# Patient Record
Sex: FEMALE | Race: BLACK OR AFRICAN AMERICAN | Employment: FULL TIME | URBAN - METROPOLITAN AREA
[De-identification: names, ages, dates, MRNs, and addresses within clinical notes are randomized per-mention and may not be internally consistent; named-entity substitution may affect disease eponyms.]

---

## 2017-11-07 ENCOUNTER — HOSPITAL ENCOUNTER (OUTPATIENT)
Dept: LAB | Age: 52
Discharge: HOME | End: 2017-11-07

## 2017-11-07 LAB — T-SPOT TB TEST (EMPLOYEE),XTBE: NORMAL

## 2018-03-15 ENCOUNTER — HOSPITAL ENCOUNTER (OUTPATIENT)
Dept: LAB | Age: 53
Discharge: HOME | End: 2018-03-15

## 2018-03-15 LAB — T-SPOT TB TEST (EMPLOYEE),XTBE: NORMAL

## 2021-08-26 LAB
CHOLEST SERPL-MCNC: 228 MG/DL
GLUCOSE SERPL-MCNC: 101 MG/DL (ref 74–99)
HDLC SERPL-MCNC: 73 MG/DL (ref 40–60)
LDLC SERPL CALC-MCNC: 133.4 MG/DL (ref 0–100)
TRIGL SERPL-MCNC: 108 MG/DL (ref ?–150)

## 2022-03-23 ENCOUNTER — HOSPITAL ENCOUNTER (OUTPATIENT)
Dept: LAB | Age: 57
Discharge: HOME | End: 2022-03-23
Payer: COMMERCIAL

## 2022-03-23 LAB
ANION GAP SERPL CALC-SCNC: 1 MMOL/L (ref 3–18)
BUN SERPL-MCNC: 16 MG/DL (ref 7–18)
BUN/CREAT SERPL: 13 (ref 12–20)
CALCIUM SERPL-MCNC: 9.6 MG/DL (ref 8.5–10.1)
CHLORIDE SERPL-SCNC: 106 MMOL/L (ref 100–111)
CO2 SERPL-SCNC: 35 MMOL/L (ref 21–32)
CREAT SERPL-MCNC: 1.2 MG/DL (ref 0.6–1.3)
GLUCOSE SERPL-MCNC: 115 MG/DL (ref 74–99)
POTASSIUM SERPL-SCNC: 3.7 MMOL/L (ref 3.5–5.5)
SODIUM SERPL-SCNC: 142 MMOL/L (ref 136–145)

## 2022-03-23 PROCEDURE — 36415 COLL VENOUS BLD VENIPUNCTURE: CPT

## 2022-03-23 PROCEDURE — 80048 BASIC METABOLIC PNL TOTAL CA: CPT

## 2022-03-24 LAB
FAX TO INFO,FAXT: NORMAL
FAX TO NUMBER,FAXN: NORMAL

## 2022-07-20 ENCOUNTER — TELEPHONE (OUTPATIENT)
Dept: PHARMACY | Age: 57
End: 2022-07-20

## 2022-07-20 NOTE — TELEPHONE ENCOUNTER
Medication Management Service    Date: 7/20/2022  Patient's Name: Evan Rowan YOB: 1965            _____________________________________________________________________________________________    Attempted to schedule patient for initial visit with the [facility] Specialty Medication Service program. LVM for patient to return call at [phone], option #4. Will attempt to contact patient again at a later time. Thank you,  Kori Roman.  Era Armendariz, [address] [place] [address] Medication Management & Specialty Medication Services  [phone]

## 2022-07-22 ENCOUNTER — TELEPHONE (OUTPATIENT)
Dept: PHARMACY | Age: 57
End: 2022-07-22

## 2022-07-22 NOTE — TELEPHONE ENCOUNTER
Medication Management Service    Date: 7/22/2022  Patient's Name: Júnior Llanes YOB: 1965            _____________________________________________________________________________________________    Attempted to schedule patient for initial visit with the [facility] Specialty Medication Service program. LVM for patient to return call at [phone], option #4. Will attempt to contact patient again at a later time. Thank you,  Tarik Greer.  Julisa Gutierrez, [address] [facility] Medication Management & Specialty Medication Services  [phone]

## 2022-08-09 ENCOUNTER — TELEPHONE (OUTPATIENT)
Dept: PHARMACY | Age: 57
End: 2022-08-09

## 2022-08-09 NOTE — TELEPHONE ENCOUNTER
Medication Management Service    Date: 8/9/2022  Patient's Name: Rita Jmaes YOB: 1965            _____________________________________________________________________________________________    Scheduled patient for initial PharmD Review with the [facility] Specialty Medication Service program on 8/11 at 8 am.       Thank you,  Ricardo Mcgrath.  Juan Parham, [address] [place] [address] Medication Management & Specialty Medication Services  [phone]

## 2022-08-11 ENCOUNTER — TELEPHONE (OUTPATIENT)
Dept: PHARMACY | Age: 57
End: 2022-08-11

## 2022-08-11 NOTE — TELEPHONE ENCOUNTER
Medication Management Service    Date: 8/11/2022  Patient's Name: Lennox Hernandez YOB: 1965            _____________________________________________________________________________________________    Unable to reach patient to complete initial PharmD review as part of the [employer] Specialty Medication Service program. LVM x 3. Will attempt to contact patient again at a later time. Thank you,  Zachariah Raymond.  Chasity Woods, [address] [place] [address] Medication Management & Specialty Medication Services  [phone]

## 2022-08-15 ENCOUNTER — TELEPHONE (OUTPATIENT)
Dept: PHARMACY | Age: 57
End: 2022-08-15

## 2022-08-15 NOTE — TELEPHONE ENCOUNTER
Medication Management Service    Date: 8/15/2022  Patient's Name: Regina Douglas YOB: 1965            _____________________________________________________________________________________________    Rescheduled visit for initial PharmD review as part of the [facility] Specialty Medication Service program on 8/17/22. Thank you,  Patricia De La Vega.  Chelsea Avila, BCPS  Clinical Pharmacist   [facility] Medication Management & Specialty Medication Services  [phone]        For Pharmacy [address] [place] [address] in place: No  Recommendation Provided To: Patient/Caregiver: 1 via Telephone  Intervention Detail: Scheduled Appointment  Intervention Accepted By: Patient/Caregiver: 1  Time Spent (min): 10

## 2022-08-16 ENCOUNTER — TELEPHONE (OUTPATIENT)
Dept: PHARMACY | Age: 57
End: 2022-08-16

## 2022-08-16 NOTE — PROGRESS NOTES
Specialty Medication Service    Patient's Name: Angela Magallanes YOB: 1965      Reason for visit: Anegla Magallanes is a 64 y.o. female presenting today for Specialty Medication Service visit. Patient is prescribed SMS formulary medication, Dupixent. Medication list updated. Specialty Medication: Dupixent 300 mg  Frequency: Every 14 days   Indication: Atopic dermatitis   Initially Diagnosed: Teenager   Additional Therapy:   Diprolene topical cream PRN flares (rarely uses)    Previous Therapy:   Numerous topical medications (all were ineffective)     Specialist:   Elvira Gunn MD  [facility] Dermatology & Skin Care  [facility] [address], [place], [address] [facility]  Phone: [phone]    Specialist Progress Note Available: No, requested 8/10/22 and 8/16/22  Last Specialist Visit: Within past few months - no significant changes per patient     Allergies   Allergen Reactions    Other Plant, Animal, Environmental Runny Nose     Seasonal allergies - nasal symptoms      Past Medical History:   Diagnosis Date    Asthma     HTN (hypertension)      Social History     Socioeconomic History    Marital status: SINGLE   Tobacco Use    Smoking status: Never   Substance and Sexual Activity    Alcohol use: No      REVIEW OF CURRENT DISEASE STATE  Dermatitis/Eczema: Angela Magallanes is here for continued evaluation of skin condition related to speciality medication use. Patient states that she has had atopic dermatitis since she was a teenager. She states that her rash was \"horrible\" and located on her face, arms, and legs. She described the rash as red, itchy and flaky. She recalls trying many different topical therapies over the years that were not effective in improving her symptoms. It was decided to start [address] in 2018, and patient states that it has \"worked [employer]" for her. She states that her skin is clear and she has not had any bad breakouts since starting the medication.  If any mild rashes do occur, she usually uses the Diprolene cream and it clears up within a few days. She states that she has not had to use the Diprolene often at all and it has been at least several months since the last time she's used it. Patient states that she has not identified any triggers that may causes symptoms to return. She does also have asthma, and states that [address] has helped her respiratory symptoms as well. Overall, she is very happy with her current control. · Considering all the ways in which this condition and others affects you, how are you doing (0 = very well, 10 = very poorly)? 0   · How would you rate your pain on average? (0 = no pain, 10 = worst pain imaginable) 0 0  · During the past 4 weeks, have you missed any planned activities of daily living due to condition (work/school/other plans)? No   · During the past 4 weeks, have you had to seek urgent care, ER admission, Unplanned doctor office visit, or hospital admission? No     MEDICATIONS  Current Outpatient Medications   Medication Sig    dupilumab (DUPIXENT) 300 mg/2 mL syrg syringe 300 mg by SubCUTAneous route. losartan-hydroCHLOROthiazide (HYZAAR) 100-25 mg per tablet Take 1 Tablet by mouth in the morning. cholecalciferol (VITAMIN D3) (2,000 UNITS /50 MCG) cap capsule Take 1 Capsule by mouth in the morning. potassium chloride (K-DUR, KLOR-CON M20) 20 mEq tablet Take 1 Tablet by mouth in the morning. mometasone (NASONEX) 50 mcg/actuation nasal spray 2 Sprays by Both Nostrils route daily. ibuprofen (MOTRIN) 800 mg tablet Take 1 Tablet by mouth three (3) times daily as needed. augmented betamethasone dipropionate (DIPROLENE-AF) 0.05 % topical cream Apply  to affected area two (2) times a day. albuterol (PROVENTIL HFA, VENTOLIN HFA, PROAIR HFA) 90 mcg/actuation inhaler Take 2 Puffs by inhalation every four (4) hours as needed.     albuterol-ipratropium (DUO-NEB) 2.5 mg-0.5 mg/3 ml nebu 3 mL by Nebulization route every four (4) hours as needed. fluticasone (FLOVENT HFA) 110 mcg/actuation inhaler Take 2 Puffs by inhalation every twelve (12) hours. Nebulizer & Compressor machine 1 Each by Does Not Apply route every four (4) hours as needed. phentermine (ADIPEX-P) 37.5 mg tablet Take 37.5 mg by mouth in the morning. (Patient not taking: Reported on 8/17/2022)    montelukast (SINGULAIR) 10 mg tablet Take 1 Tab by mouth nightly. (Patient not taking: Reported on 8/17/2022)     No current facility-administered medications for this visit. - Patient not currently taking montelukast or phentermine, but requests that these remain on her list for now. Current Specialty Medication: Dupilumab (Dupixent)   Indication Specific dose: Atopic Dermatitis: 600 mg then 300 mg every 2 weeks  Warnings/Precautions: Hypersensitivity; Eosinophilia and vasculitis; Conjunctivitis and keratitis; Dupilumab antibodies, including neutralizing antibodies, may develop; Avoid the use of live vaccines   Recommended Monitoring: Annual PFTs (Asthma); Annual Eye exams  Storage: Store refrigerated at 36°F to 46°F (2°C to 8°C) in the original carton to protect from light. Do NOT freeze. Do NOT expose to heat. Do NOT shake. Handling: Pre-filled syringes may be kept at room temperature up to 77°F (25°C) for a maximum of 14 days. Do not expose to heat or direct sunlight. Prior to administration: Remove from refrigerator and allow to naturally warm up to room temperature before use. Do not use beyond expiration date on label. Patient Reported Side Effects: Occasional numbness after injection (mild)   Specialty Medication Start Date: 2018  Appropriate Dose: Yes    Describe your medication adherence over the last 4 weeks: Excellent  How many doses have you missed in the last 4 weeks, if any? 0  How confident are you to follow the injection process and the treatment plan? (0-10) 10 Who injects? Patient  Does the patient have a current infection of any kind? No    Contraindications to therapy present? No    Drug Interactions:  No clinically significant interactions identified via [employer] Interaction Analysis as category D or higher.  _____________________________________________________________________  Renal Dosing:  Creatinine Clearance: CrCl cannot be calculated (Unknown ideal weight.). No renal adjustments necessary. RECENT LABS    Annual eye exam: Up to date; no concerns noted per patient     CMP  Lab Results   Component Value Date/Time    Sodium 142 03/23/2022 03:53 PM    Potassium 3.7 03/23/2022 03:53 PM    Chloride 106 03/23/2022 03:53 PM    CO2 35 (H) 03/23/2022 03:53 PM    Anion gap 1 (L) 03/23/2022 03:53 PM    Glucose 115 (H) 03/23/2022 03:53 PM    BUN 16 03/23/2022 03:53 PM    Creatinine 1.20 03/23/2022 03:53 PM    BUN/Creatinine ratio 13 03/23/2022 03:53 PM    GFR est AA 56 (L) 03/23/2022 03:53 PM    GFR est non-AA 46 (L) 03/23/2022 03:53 PM    Calcium 9.6 03/23/2022 03:53 PM     CBC - completed 2/18/22 (Care Everywhere)        Lipid Panel - completed 2/18/22 (Care Everywhere)       IMMUNIZATIONS  Immunization History   Administered Date(s) Administered    COVID-19, PFIZER PURPLE top, DILUTE for use, (age 15 y+), IM, 30mcg/0.3mL 11/02/2021, 11/23/2021    Influenza Vaccine 10/18/2019, 11/16/2020, 12/06/2021    Tdap 06/20/2016     Immunization status: up to date and documented, missing doses of Shingles vaccine and COVID-19 booster. ASSESSMENTS  Fall Risk Assessment, last 12 mths 8/17/2022   Able to walk? Yes   Fall in past 12 months? 0     [id number] Questions Score   In general, would you say your health is: 3   2. In general, would you say your quality of life is: 3   3. In general, how would you rate your physical health? 3   4. In general, how would you rate your mental health, including mood and your ability to think? 5   5. In general, how would you rate your satisfaction with your social activities and relationships? 5   6.    In general, please rate how well you carry out your usual social activities and roles. 5   7. To what extent are you able to carry out your everyday physical activities such as walking, climbing stairs, carrying groceries, or moving a chair? 5   8. In the past 7 days, how often have you been bothered by emotional problems such as feeling anxious, depressed or irritable? 5   9. In the past 7 days, how would you rate your fatigue on average? 5   10. How would you rate your pain on average? 0   Promise Physical Score (Questions: 3, 7, 9, 10) 18   Promise Mental Score (Questions: 2, 4, 5, 8) 18     Dermatitis/Eczema  Cesia Erickson is a 64 y.o. female being treated for Atopic Dermatitis/eczema. Medication reconciliation completed (information obtained from patient, no significant drug-drug interactions identified. Allergy and diagnosis info reviewed and updated. Cesia Erickson has a history remarkable for the following conditions: Atopic dermatitis, asthma, HTN, vitamin D deficiency, hypokalemia   The patient has previously been treated with numerous topical medications (all ineffective - patient couldn't recall specific names). Current therapy includes: Dupixent 300 mg SC every 2 weeks, Diprolene topical cream PRN flares   Warnings, precautions, and contraindications reviewed with the patient. Also reviewed storage, administration, and proper disposal.  Current disease state symptoms include: None - skin clear. May occasionally have a mild rash that is relieved with Diprolene cream (has been a while since this occurred). Medication Effectiveness: Patient disease is well controlled on current therapy. No severe breakthrough rashes since starting Dupixent in 2018. Reviewed potential side effects/ADEs. No side effects/adverse events reported, and no adherence issues identified.   Functional and cognitive limitations include: None  Reviewed copay and advised patient that they will receive a copy of the patient rights and responsibility document with their welcome packet in the mail. Patient is not considered high risk. Based on patient feedback/results of the assessment, the therapy is not  still appropriate. No medication-related problem(s) or patient need(s) identified that would require a care plan. Follow up in 180 days     2. Immunization  Immunization status: up to date and documented, missing doses of COVID19 booster and Shingles vaccine. Patient states that she is not interested in obtaining these vaccines at this time. 3. Drug Interaction  No clinically significant drug-drug interactions identified during this visit     4. Other Identified Potential Issues  Discussed with patient the Pharmacist Collaborative Practice Agreement. Patient provided verbal and/or electronic (ex. [employer]) consent to participate in the collaborative practice agreement between the pharmacist and referred patient. This is in lieu of paper consent due to COVID-19 precautions and the use of remote/virtual visits. Lab Monitoring: Discussed lab abnormalities indicated on patient's labs from February 2022. Encouraged patient to work on lifestyle modifications to improve control and to follow up with PCP as directed. She expressed understanding. PLAN  Goals of therapy, common side effects, medication storage, and administration reviewed with patient. Continue Dupixent 300 mg SC every 2 weeks as prescribed    Recommended monitoring to complete: None at this time. Continue following with PCP for routine labs to assess cholesterol and blood sugar levels  Recommended vaccinations: COVID-19 booster, Shingrix - patient declined   Keep all scheduled appointments. Thank you,  Thania Galvez.  Baldomero Willis, BCPS  Clinical Pharmacist   [facility] Medication Management & Specialty Medication Services  [phone]        For Pharmacy [address] [place] [address] in place: Yes  Recommendation Provided To: Provider: 1 via Note to Provider  and Patient/Caregiver: 1 via Telephone  Intervention Detail: Adherence Monitorin, Refill(s) Provided, and Vaccine Recommended/Administered  Intervention Accepted By: Provider: 1 and Patient/Caregiver: 0  Time Spent (min):  90

## 2022-08-16 NOTE — TELEPHONE ENCOUNTER
Medication Management Service    Date: 8/16/2022  Patient's Name: Shamar Lackey YOB: 1965            _____________________________________________________________________________________________    Chart notes requested from [facility] Dermatology and Skin Care via telephone (voicemail) and fax in preparation for patient's upcoming SMS visit with [address] [place] [address],[address]. Thank you,  Janet Cole. Nickie Birmingham, BCPS  Clinical Pharmacist   [facility] Medication Management & Specialty Medication Services  [phone]        For Pharmacy [address] [place] [address] in place: No  Recommendation Provided To:  Other: 0  Time Spent (min): 15

## 2022-08-17 ENCOUNTER — VIRTUAL VISIT (OUTPATIENT)
Dept: PHARMACY | Age: 57
End: 2022-08-17

## 2022-08-17 DIAGNOSIS — L20.9 ATOPIC DERMATITIS, UNSPECIFIED TYPE: Primary | ICD-10-CM

## 2022-08-17 NOTE — Clinical Note
Em Siegel. Completed pharmacy review for initial SMS visit. Patient is continuing on [address] for AD/eczema. No recommendations for therapy at this time. Patient is recommended for COVID-19 booster and Shingrix (she declines at this time). Medication pended for review. Following up in 6 months. Thank you, Lee Price.  Betty Valdes 14 Medication Management & Specialty Medication Services [phone]

## 2022-08-17 NOTE — LETTER
2022 3:44 PM    Hello,    I am a pharmacist with the Specialty Medication Service offered through [facility] [place] and we have mutual patient Shamar Lackey : 1965. I am scheduled to speak with the patient on 22 to discuss their [address]. If the patient has a signed Release of Information (RHETT) form on file, please fax any office visit notes and recent labs to our office at [phone] so we can add it to the patients chart with [facility].             Sincerely,      Thierry Presley, BRAYAND

## 2022-10-03 ENCOUNTER — VIRTUAL VISIT (OUTPATIENT)
Dept: PHARMACY | Age: 57
End: 2022-10-03

## 2022-10-03 DIAGNOSIS — Z87.09 HISTORY OF ASTHMA: ICD-10-CM

## 2022-10-03 DIAGNOSIS — L20.9 ATOPIC DERMATITIS, UNSPECIFIED TYPE: Primary | ICD-10-CM

## 2022-10-03 RX ORDER — DUPILUMAB 300 MG/2ML
300 INJECTION, SOLUTION SUBCUTANEOUS EVERY 2 WEEKS
Qty: 4 ML | Refills: 5 | Status: SHIPPED | OUTPATIENT
Start: 2022-10-03

## 2022-10-03 NOTE — PROGRESS NOTES
Initial Specialty Medication Virtual Visit  [facility]  [address]  [place] [address]  Dept: [phone]  Dept Fax: [phone]: [phone]  Date of patient's visit: 10/3/2022  Patient's Name:  Marino Verduzco YOB: 1965            Patient Care Team:  Jeana Grigsby MD as PCP - General (Family Medicine)  ================================================================    This visit will not be billed      CC: SMS program    HPI  Marino Verduzco is 62 y.o. is here for initial virtual visit for specialty medication. Atopic Dermatitis  Dx with atopic dermatitis for many years. Extensive body coverage. Tried topicals but still difficult to control. Went on [address] in 2018 and did excellent. Also helping with her asthma. She does have occ muscle aching after the injection but this resolves sponteaneously. She feels the benefits of the medication outweigh this. Denies any other side effects including: local site reaction, URI, gastritis, ophthalmic issues, and arthralgias. Review of Pertinent info from Pharmacist:  \"Specialty Medication: Dupixent 300 mg  Frequency: Every 14 days   Indication: Atopic dermatitis   Initially Diagnosed: Teenager   Additional Therapy:   Diprolene topical cream PRN flares (rarely uses)     Previous Therapy:  Numerous topical medications (all were ineffective)      Specialist:   Gregory Novak MD  [facility] Dermatology & Skin Care  [facility] [address], [place], [address] [facility]  Phone: [phone]     Specialist Progress Note Available: No, requested 8/10/22 and 8/16/22  Last Specialist Visit: Within past few months - no significant changes per patient\"    Patient Active Problem List   Diagnosis Code    Dyspnea R06.00    Acute asthma exacerbation J45. 901    Respiratory alkalosis E87.3     Past Medical History:   Diagnosis Date    Asthma     HTN (hypertension)        Social History Tobacco Use    Smoking status: Never   Substance Use Topics    Alcohol use: No       History reviewed. No pertinent family history. PE  There were no vitals taken for this visit. ASSESSMENT AND PLAN:  Pt is doing well on Dupixent and will continue prescribing through SMS program.  Encouraged follow up with primary care and Derm. Diagnoses and all orders for this visit:    1. Atopic dermatitis, unspecified type  -     AMB REFERRAL TO SPECIALTY MEDICATION SERVICE  -     dupilumab (Dupixent Pen) 300 mg/2 mL pnij; 300 mg by SubCUTAneous route Once every 2 weeks. 2. History of asthma      FOLLOW UP AND INSTRUCTIONS:  Follow up with Pharm D in 6 months    Discussed use, benefit, and side effects of prescribed medications. Barriers to medication compliance addressed. Patient to follow up with specialist regularly. All patient questions answered. Pt voiced understanding.       Jovanny Henderson MD  Medical Directory, [facility] primary care    10/3/2022, 4:20 PM

## 2023-02-23 ENCOUNTER — TELEPHONE (OUTPATIENT)
Dept: PHARMACY | Age: 58
End: 2023-02-23

## 2023-02-23 NOTE — TELEPHONE ENCOUNTER
Specialty Medication Service    Date: 2/23/2023  Patient's Name: Sheila Rubio YOB: 1965            _____________________________________________________________________________________________    Left message to schedule [facility] follow-up appointment for Specialty Medication Services. Please call: [phone] option 4. Will continue to outreach as appropriate. Thank you,  Ursula Grayson.  Trinh Maya BCPS  Clinical Pharmacist

## 2023-02-27 NOTE — TELEPHONE ENCOUNTER
Specialty Medication Service    Date: 2/27/2023  Patient's Name: Anel Groves YOB: 1965            _____________________________________________________________________________________________    Left message to schedule [facility] follow-up appointment for Specialty Medication Services. Please call: [phone] option 4. Will continue to outreach as appropriate.       Sumeet Liu, PharmD [facility]  Ambulatory Clinical Pharmacist  Specialty Medication Services  Phone: [phone] option #4  Fax: [phone]

## 2023-03-01 NOTE — TELEPHONE ENCOUNTER
Specialty Medication Service    Date: 3/1/2023  Patient's Name: Nathan Thakur YOB: 1965            _____________________________________________________________________________________________    Left message to schedule SMS follow-up appointment for Specialty Medication Services. Please call: [phone] option 4. Will continue to outreach as appropriate and send [employer] message. Thank you,  Jose Hoff.  Wilton Wilson, [facility]  Clinical Pharmacist         For Pharmacy Admin Tracking Only    Program: SMS  CPA in place: Yes  Recommendation Provided To: Patient/Caregiver: 1 via Telephone  Intervention Detail: Scheduled Appointment  Intervention Accepted By: Patient/Caregiver: 0  Time Spent (min): 10

## 2023-04-19 NOTE — PROGRESS NOTES
Specialty Medication Service    Patient's Name: Samantha Michel YOB: 1965      Reason for visit: Samantha Michel is a 62 y.o. female presenting today for Specialty Medication Service visit follow up. Patient last seen by [facility] 10/03/22. Patient continues on SMS formulary medication, Dupixent. Pharmacy completed Specialty Medication Service visit for medication monitoring and counseling. Medication list updated. Specialty Medication: Dupixent 300 mg  Frequency: Every 14 days   Indication: Atopic dermatitis   Initially Diagnosed: Teenager   Additional Therapy:   Diprolene topical cream PRN flares (rarely uses)     Previous Therapy:   Numerous topical medications (all were ineffective)      Specialist:   Crystal Conteh MD  [facility]  [address] [place], [place], [address] [facility]  Phone: [phone]  Specialist Progress Note Available: No, office refuses to provide. Patient states her office told her they will not provide us any notes. Requested the patient ask again for the benefit of continued care in our program. Patient agreeable. Last Specialist Visit: Per patient last seen ~January. No changes. Follow-up every 6 months. Allergies   Allergen Reactions    Other Plant, Animal, Environmental Runny Nose     Seasonal allergies - nasal symptoms         Past Medical History:   Diagnosis Date    Asthma     HTN (hypertension)       Social History     Tobacco Use    Smoking status: Never    Smokeless tobacco: Not on file   Substance Use Topics    Alcohol use: No     No family history on file. INTERM HISTORY  Have you been diagnosed with any additional conditions since we last talked? No  Have you developed any new allergies since we last talked? No  Have you stopped taking any medications or supplements since we last talked? No  Have you started taking any additional medications or supplements since we last talked?  No    REVIEW OF CURRENT DISEASE STATE  Dermatitis/Eczema: Irasema Maxwell is here for continued evaluation of skin condition related to speciality medication use. Patient states that she has had atopic dermatitis since she was a teenager. She states that her rash was \"horrible\" and located on her face, arms, and legs. She described the rash as red, itchy and flaky. She recalls trying many different topical therapies over the years that were not effective in improving her symptoms. It was decided to start [address] in 2018, and patient states that it has \"worked [employer]" for her. She states that her skin is clear and she has not had any bad breakouts since starting the medication. If any mild rashes do occur, she usually uses the Diprolene cream and it clears up within a few days. She states that she has not had to use the Diprolene often at all and it has been at least several months since the last time she's used it. Patient states that she has not identified any triggers that may causes symptoms to return. She does also have asthma, and states that [address] has helped her respiratory symptoms as well. Overall, she is very happy with her current control. Current SMS: Patient reports her atopic dermatitis has been \"wonderful\" with Dupixent. Current symptoms none. States skin has been clear since 2018 and no flares. Patient rarely using diprolene. Additionally, patient's asthma has been stable and only needing rescue inhaler ~twice per week due to pollen. Patient tolerating Dupixent well, without any side effects. Patient only concern right now is re approval of her Dupixent. Her provider office has been difficult in completing an appeal for her PA. It was originally denied end of March due to office stating she was also taking Fonnie Yeboah, which she was not. Her office has provided her two pens in the meantime with our assistance so patient has not missed a dose. However, it will need to be approved sooner than later.  Patient has no other questions or concerns today. · Number of atopic dermatitis flares in the last month? none  · Considering all the ways in which this condition and others affects you, how are you doing (0 = very well, 10 = very poorly)? 2  · How would you rate your pain on average? (0 = no pain, 10 = worst pain imaginable) 0  · During the past 4 weeks, have you missed any planned activities of daily living due to condition (work/school/other plans)? No  · During the past 4 weeks, have you had to seek urgent care, ER admission, Unplanned doctor office visit, or hospital admission? No    MEDICATIONS  Current Outpatient Medications   Medication Sig Dispense Refill    dupilumab (Dupixent Pen) 300 mg/2 mL pnij 300 mg by SubCUTAneous route Once every 2 weeks. 4 mL 5    dupilumab (DUPIXENT) 300 mg/2 mL syrg syringe 300 mg by SubCUTAneous route. losartan-hydroCHLOROthiazide (HYZAAR) 100-25 mg per tablet Take 1 Tablet by mouth in the morning. cholecalciferol (VITAMIN D3) (2,000 UNITS /50 MCG) cap capsule Take 1 Capsule by mouth in the morning. potassium chloride (K-DUR, KLOR-CON M20) 20 mEq tablet Take 1 Tablet by mouth in the morning. phentermine (ADIPEX-P) 37.5 mg tablet Take 37.5 mg by mouth in the morning. (Patient not taking: Reported on 8/17/2022)      mometasone (NASONEX) 50 mcg/actuation nasal spray 2 Sprays by Both Nostrils route daily. ibuprofen (MOTRIN) 800 mg tablet Take 1 Tablet by mouth three (3) times daily as needed. augmented betamethasone dipropionate (DIPROLENE-AF) 0.05 % topical cream Apply  to affected area two (2) times a day. albuterol (PROVENTIL HFA, VENTOLIN HFA, PROAIR HFA) 90 mcg/actuation inhaler Take 2 Puffs by inhalation every four (4) hours as needed. 1 Inhaler 1    albuterol-ipratropium (DUO-NEB) 2.5 mg-0.5 mg/3 ml nebu 3 mL by Nebulization route every four (4) hours as needed.  30 Nebule 1    fluticasone (FLOVENT HFA) 110 mcg/actuation inhaler Take 2 Puffs by inhalation every twelve (12) hours. 1 Inhaler 1    montelukast (SINGULAIR) 10 mg tablet Take 1 Tab by mouth nightly. (Patient not taking: Reported on 8/17/2022) 30 Tab 1    Nebulizer & Compressor machine 1 Each by Does Not Apply route every four (4) hours as needed. 1 Each 0       Current Specialty Medication: Dupilumab (Dupixent)   Indication Specific dose: Atopic Dermatitis: 600 mg then 300 mg every 2 weeks  Warnings/Precautions: Hypersensitivity; Eosinophilia and vasculitis; Conjunctivitis and keratitis; Dupilumab antibodies, including neutralizing antibodies, may develop; Avoid the use of live vaccines   Recommended Monitoring: Annual PFTs (Asthma); Annual Eye exams  Storage: Store refrigerated at 36°F to 46°F (2°C to 8°C) in the original carton to protect from light. Do NOT freeze. Do NOT expose to heat. Do NOT shake. Handling: Pre-filled syringes may be kept at room temperature up to 77°F (25°C) for a maximum of 14 days. Do not expose to heat or direct sunlight. Prior to administration: Remove from refrigerator and allow to naturally warm up to room temperature before use. Do not use beyond expiration date on label. Patient Reported Side Effects: Occasional numbness after injection (mild)   Ocular issues: denies  Joint pain: denies  Specialty Medication Start Date: 2018  Appropriate Dose: Yes    Describe your medication adherence over the last 4 weeks: Excellent Patient received two samples from office due to SMS reaching out to office. How many doses have you missed in the last 4 weeks, if any? no  How confident are you to follow the injection process and the treatment plan? (0-10) 10 Who injects? self  Does the patient have a current infection of any kind? No    Contraindications to therapy present? No    Drug Interactions:   The following clinically significant interactions were identified via "[employer] Interaction Analysis as category D or higher: potassium chloride + ipratropium: anticholinergic agents may enhance the ulcerogenic effect of potassium chloride. However, agents with lesser anticholinergic effects (inhaled products) are of lesser concern. Patient denies abdominal pain or blood in stools. _____________________________________________________________________  Renal Dosing:  Creatinine Clearance: CrCl cannot be calculated (Patient's most recent lab result is older than the maximum 180 days allowed. ). No renal adjustments necessary.     LABS    COMPREHENSIVE METABOLIC PANEL (04/70/8534 2:35 PM EST)  Lab Results - COMPREHENSIVE METABOLIC PANEL (38/62/7915 2:35 PM EST)  Component Value Ref Range Test Method Analysis Time Performed At Pathologist Signature   Potassium 4.2 3.5 - 5.5 mmol/L [facility]   02/23/2023 5:52 AM EST [facility]     Sodium 145 133 - 145 mmol/L [facility]   02/23/2023 5:52 AM EST [facility]     Chloride 106 98 - 110 mmol/L [facility]   02/23/2023 5:52 AM EST [facility]     Glucose 81 70 - 99 mg/dL [facility]   02/23/2023 5:52 AM EST [facility]     Calcium 9.7 8.4 - 10.5 mg/dL [facility]   02/23/2023 5:52 AM EST [facility]     Albumin 4.4 3.5 - 5.0 g/dL [facility]   02/23/2023 5:52 AM EST [facility]     SGPT (ALT) 20 5 - 40 U/L [facility]   02/23/2023 5:52 AM EST [facility]     SGOT (AST) 28 10 - 37 U/L [facility]   02/23/2023 5:52 AM EST [facility]     Bilirubin Total 0.3 0.2 - 1.2 mg/dL [facility]   02/23/2023 5:52 AM [facility]     Alkaline Phosphatase 103 25 - 115 U/L [facility]   02/23/2023 5:52 AM [facility]     BUN 9 6 - 22 mg/dL [facility]   02/23/2023 5:52 AM EST [facility]     CO2 28 20 - 32 mmol/L [facility]   02/23/2023 5:52 AM EST [facility]     Creatinine 0.9 0.5 - 1.2 mg/dL [facility]   02/23/2023 5:52 AM EST [facility]     eGFR >60.0 >60.0 mL/min/1.73 sq.m.  [facility]   02/23/2023 5:52 AM EST [facility]       CBC WITH DIFFERENTIAL AUTO (02/22/2023 2:35 PM EST)  Lab Results - CBC WITH DIFFERENTIAL AUTO (02/22/2023 2:35 PM EST)  Component Value Ref Range Test Method Analysis Time Performed At [facility]   WBC 6.0 4.0 - 11.0 K/uL   02/23/2023 5:55 AM EST [facility]     RBC 4.69 3.80 - 5.20 M/uL   02/23/2023 5:55 AM EST [facility]     HGB 12.2 11.7 - 16.0 g/dL   02/23/2023 5:55 AM EST [facility]     HCT 39.6 35.1 - 48.0 %   02/23/2023 5:55 AM EST [facility]     MCV 84 80 - 99 fL   02/23/2023 5:55 AM EST [facility]     MCH 26 26 - 34 pg   02/23/2023 5:55 AM EST [facility]     MCHC 31 31 - 36 g/dL   02/23/2023 5:55 AM EST [facility]     RDW 13.9 10.0 - 15.5 %   02/23/2023 5:55 AM EST [facility]     Platelet 445 175 - 418 K/uL   02/23/2023 5:55 AM EST [facility]     MPV 10.7 9.0 - 13.0 fL   02/23/2023 5:55 AM EST [facility]     Segmented Neutrophils (Auto) 46 40 - 75 %   02/23/2023 5:55 AM EST [facility]     Lymphocytes (Auto) 44 20 - 45 %   02/23/2023 5:55 AM EST [facility]     Monocytes (Auto) 7 3 - 12 %   02/23/2023 5:55 AM EST [facility]     Eosinophils (Auto) 2 0 - 6 %   [facility]23[facility]2023 5:55 [facility]     Basophils (Auto) 1 0 - 2 %   02/23/2023 5:55 [facility]     Absolute Neutrophils (Auto) 2.8 1.8 - 7.7 K/uL   02/23/2023 5:55 [facility] Absolute Lymphocytes (Auto) 2.6 1.0 - 4.8 K/uL   02/23/2023 5:55 AM EST [facility]     Absolute Monocytes (Auto) 0.4 0.1 - 1.0 K/uL   02/23/2023 5:55 AM EST [facility]     Absolute Eosinophils (Auto) 0.1 0.0 - 0.5 K/uL   02/23/2023 5:55 AM EST [facility]     Absolute Basophils (Auto) 0.0 0.0 - 0.2 K/uL   02/23/2023 5:55 AM EST [facility]      IMMUNIZATIONS  Immunization History   Administered Date(s) Administered    COVID-19, PFIZER PURPLE top, DILUTE for use, (age 15 y+), IM, 30mcg/0.3mL 11/02/2021, 11/23/2021    Influenza Vaccine 10/18/2019, 11/16/2020, 12/06/2021    Tdap 06/20/2016      Immunization status: up to date and documented, missing doses of Shingles, covid booster. ASSESSMENTS  Fall Risk Assessment, last 12 mths 8/17/2022   Able to walk? Yes   Fall in past 12 months? 0     3 most recent PHQ Screens 4/21/2023   Little interest or pleasure in doing things Not at all   Feeling down, depressed, irritable, or hopeless Not at all   Total Score PHQ 2 0         [id number] Questions Score   In general, would you say your health is: 3   2. In general, would you say your quality of life is: 3   3. In general, how would you rate your physical health? 3   4. In general, how would you rate your mental health, including mood and your ability to think? 5   5. In general, how would you rate your satisfaction with your social activities and relationships? 5   6. In general, please rate how well you carry out your usual social activities and roles. 5   7. To what extent are you able to carry out your everyday physical activities such as walking, climbing stairs, carrying groceries, or moving a chair? 5   8. In the past 7 days, how often have you been bothered by emotional problems such as feeling anxious, depressed or irritable? 5   9. In the past 7 days, how would you rate your fatigue on average? 5   10. How would you rate your pain on average?  0   Promise Physical Score (Questions: 3, 7, 9, 10) 18   Promise Mental Score (Questions: 2, 4, 5, 8) 18        Dermatitis/Eczema  Gina Morley is a 62 y.o. female being treated for Atopic Dermatitis  Medication reconciliation completed (Patient provided), no new drug-drug interactions identified. Allergy and diagnosis info reviewed and updated. Gina Morley has a history remarkable for the following conditions: Atopic dermatitis, asthma, HTN, vitamin D deficiency, hypokalemia   Current therapy includes: Dupixent 300 mg SC every 2 weeks, Diprolene topical cream PRN flares   Medication Effectiveness: Patient disease is well controlled on current therapy. Patient skin has been clear since starting in 2018, denies flares  Patient had no questions regarding the medication's warnings, precautions, and contraindications. Confirmed appropriate storage and disposal.  Patient had no concerns with the administration process. Current disease state symptoms include: None  Continues to experience injection site pain, but is tolerable. No other side effects/adverse events reported, and no adherence issues identified. Functional and cognitive limitations include: none  Patient is not considered high risk. Based on patient feedback/results of the assessment, the therapy is still appropriate. No medication-related problem(s) or patient need(s) identified that would require a care plan. Follow up in 180 days     2. Immunization  Immunization status: up to date and documented, missing doses of COVID19 booster and Shingles vaccine. Patient states that she is not interested in obtaining these vaccines at this time. States already getting too many injections in a month due to needing Dupixent. Will discuss at future SMS. 3. Drug Interaction  Potassium chloride + ipratropium: anticholinergic agents may enhance the ulcerogenic effect of potassium chloride.  However, agents with lesser anticholinergic effects (inhaled products) are of lesser concern. Patient denies any abdominal pain, GI bleeding or indigestion. Educated patient on potential interaction and to advise provider of any changes right away. Patient is agreeable. No further action needed at this time. 4. Other Identified Potential Issues  Prior authorization: Informed patient I had left her office another detailed message yesterday regarding need to submit an appeal or PA. This was my fourth attempt to reach the office. Informed her the message must have worked, because as of this morning she has a new PA pending. Informed patient I will be on PTO next week, but our SMS team will monitor the PA and advised patient to reach out if needed. Patient is appreciative and no further questions at this time. Follow-up as appropriate. PLAN  Goals of therapy, common side effects, medication storage, and administration reviewed with patient. Continue Dupixent 300 mg every 14 days as prescribed. Recommended monitoring to complete: None at this time  Recommended vaccinations: Shingrix, Covid booster (patient refused)  Keep all scheduled appointments. Renetta Payne, who was evaluated through a synchronous (real-time) audio only encounter, and/or her healthcare decision maker, is aware that it is a billable service, which includes applicable co-pays, with coverage as determined by her insurance carrier. She provided verbal consent to proceed and patient identification was verified. This visit was conducted pursuant to the emergency declaration under the [facility], [address] [place] [address] authority and the [employer] Act. A caregiver was present when appropriate. Ability to conduct physical exam was limited.  The patient was located at: Home: [place] [id number]  The provider was located at: Home: [place]    --Javan Ayala on 4/21/2023 at 12:33 PM      Tanja Ramirez PharmD [facility]  Ambulatory Clinical Pharmacist  Specialty Medication Services  Phone: [phone] option #4  Fax: [phone]    For Pharmacy Admin Tracking Only    Program: SMS  CPA in place: Yes  Recommendation Provided To: Patient/Caregiver: 1 via Virtual Visit  Intervention Detail: Vaccine Recommended/Administered  Intervention Accepted By: Patient/Caregiver: 0  Time Spent (min):  90

## 2023-04-21 ENCOUNTER — VIRTUAL VISIT (OUTPATIENT)
Dept: PHARMACY | Age: 58
End: 2023-04-21

## 2023-04-21 DIAGNOSIS — L20.9 ATOPIC DERMATITIS, UNSPECIFIED TYPE: Primary | ICD-10-CM

## 2023-06-05 DIAGNOSIS — L20.9 ATOPIC DERMATITIS, UNSPECIFIED TYPE: Primary | ICD-10-CM

## 2023-06-05 NOTE — TELEPHONE ENCOUNTER
Specialty Medication Service    Date: 6/5/2023  Patient's Name: Nicolas Weir YOB: 1965            _____________________________________________________________________________________________    Nicolas Weir is a 62 y.o. female enrolled in the Specialty Medication Service. We received a refill request for Dupixent on 6/5/2023. Original Rx was written for 1+5 fills on 6/2/2023.      Thank you,    Daniel Hendrix      Requested Prescriptions     Pending Prescriptions Disp Refills    dupilumab (DUPIXENT) 300 MG/2ML SOPN injection 4 mL 5     Sig: Inject 2 mLs into the skin every 14 days

## 2023-06-06 RX ORDER — DUPILUMAB 300 MG/2ML
INJECTION, SOLUTION SUBCUTANEOUS
Qty: 4 ML | Refills: 5 | Status: SHIPPED | OUTPATIENT
Start: 2023-06-06

## 2023-06-06 NOTE — TELEPHONE ENCOUNTER
Specialty Medication Service    Date: 6/6/2023  Patient's Name: Porter Chandra YOB: 1965            _____________________________________________________________________________________________    Porter Chandra is a 62 y.o. female enrolled in the Specialty Medication Service. We received a refill request for Dupixent on 06/05/2023. Patient last saw SMS in April 2023. Patient disease is well controlled on current therapy. Patient skin has been clear since starting in 2018, denies flares. No concerns continuing treatment. Will send new SMS refill. Original Rx was written for 6 fills on 06/02/2023.      Thank you,    Joseph Pedroza, [address]      Requested Prescriptions     Pending Prescriptions Disp Refills    dupilumab ([place]) 300 MG/2ML SOPN injection 4 mL 5     Sig: Inject 2 mLs into the skin every 14 days      Joseph Pedroza, PharmD [facility] [facility]  Ambulatory Clinical Pharmacist  Specialty Medication Services  Phone: [phone] option #4  Fax: [phone]    For Pharmacy Admin Tracking Only    Program: SMS  CPA in place:  Yes  Recommendation Provided To: Patient/Caregiver: 1 via Telephone  Intervention Detail: Refill(s) Provided  Intervention Accepted By: Patient/Caregiver: 1    Time Spent (min): 15

## 2023-08-16 ENCOUNTER — TELEPHONE (OUTPATIENT)
Facility: HOSPITAL | Age: 58
End: 2023-08-16

## 2023-08-16 NOTE — TELEPHONE ENCOUNTER
Specialty Medication Service    Date: 8/16/2023  Patient's Name: Kaity Maldonado YOB: 1965            _____________________________________________________________________________________________    Reached patient to schedule Medical Director  appointment for Specialty Medication Services.  Patient scheduled 08/28/2023    Renée Uriostegui CPhT  Clinical   Specialty Medication Services  Phone: [phone] option #4  Fax: [phone]    For Pharmacy Admin Tracking Only    Program: TIM  CPA in place:  No  Recommendation Provided To: Patient/Caregiver: 1 via Telephone  Intervention Detail: Scheduled Appointment  Intervention Accepted By: Patient/Caregiver: 1  Time Spent (min): 15

## 2023-08-28 ENCOUNTER — PHARMACY VISIT (OUTPATIENT)
Facility: HOSPITAL | Age: 58
End: 2023-08-28

## 2023-08-28 DIAGNOSIS — L30.9 ECZEMA, UNSPECIFIED TYPE: Primary | ICD-10-CM

## 2023-08-28 NOTE — PROGRESS NOTES
[place]  Specialty Medication Follow up Virtual Visit  [facility]  [place]  [address] [address] [address]  Dept: [phone]  Loc: [phone]  Date of patient's visit: 8/28/2023  Patient's Name:  Perla Tafoya YOB: 1965            Patient Care Team:  Verna Fong MD as PCP - General  ================================================================    REASON FOR VISIT/CHIEF COMPLAINT:  Eczema    HISTORY OF PRESENTING ILLNESS:  Perla Tafoya is 62 y.o. is here for a follow up virtual visit for specialty medication. Patient being seen today for their annual evaluation. Last saw  Dr. Delia Schaefer (10/2022). Atopic dermatitis:  Patient has chronic atopic dermatitis. Patient has failed trial of multiple topical steroids prior to starting [address] in 2018. Reports having severe flares in the past which have significantly improved since being on Dupixent. Has topical cream for flares. This have also helped with her asthma symptoms. Generally well tolerated beside injection site reaction which generally self resolve. Follows with dermatologist.  Yearly ocular exam: due for one this year . Side effects: Denies any ocular problems, signs or symptoms of arthralgia, injection site reaction, HSV or gastritis. No additional complain at this time. Reviewed pharmacist notes.     DIAGNOSTIC FINDINGS:  CBC:No results found for: WBC, HGB, PLT    BMP:    Lab Results   Component Value Date/Time     03/23/2022 03:53 PM    K 3.7 03/23/2022 03:53 PM     03/23/2022 03:53 PM    CO2 35 03/23/2022 03:53 PM    BUN 16 03/23/2022 03:53 PM    CREATININE 1.20 03/23/2022 03:53 PM    GLUCOSE 115 03/23/2022 03:53 PM       HEMOGLOBIN A1C: No results found for: LABA1C    FASTING LIPID PANEL:  Lab Results   Component Value Date    CHOL 228 (H) 08/26/2021    HDL 73 (H) 08/26/2021    TRIG 108 08/26/2021       PHYSICAL EXAM:  There were

## 2023-10-03 ENCOUNTER — ENROLLMENT (OUTPATIENT)
Facility: HOSPITAL | Age: 58
End: 2023-10-03

## 2023-10-03 ENCOUNTER — TELEPHONE (OUTPATIENT)
Facility: HOSPITAL | Age: 58
End: 2023-10-03

## 2023-10-03 NOTE — TELEPHONE ENCOUNTER
Specialty Medication Service    Date: 10/3/2023  Patient's Name: Kaity Maldonado YOB: 1965            _____________________________________________________________________________________________    Left message to schedule PharmD follow up appointment for Specialty Medication Services. Please call: [phone] option 4. Will continue to outreach as appropriate.     Demarcus ChawlaD [facility]  Ambulatory Clinical Pharmacist  Specialty Medication Services  Phone: [phone] option #4  Fax: [phone]

## 2023-10-05 NOTE — TELEPHONE ENCOUNTER
Specialty Medication Service    Date: 10/5/2023  Patient's Name: Nathan Shetty YOB: 1965            _____________________________________________________________________________________________    Reached patient to schedule PharmD follow up appointment for Specialty Medication Services.  Patient scheduled 10/10/23    Courtney Grey PharmD [facility]  Ambulatory Clinical Pharmacist  Specialty Medication Services  Phone: [phone] option #4  Fax: [phone]    For Pharmacy Admin Tracking Only    Program: SMS  CPA in place:  Yes  Recommendation Provided To: Patient/Caregiver: 1 via Telephone  Intervention Detail: Scheduled Appointment  Intervention Accepted By: Patient/Caregiver: 1    Time Spent (min): 15

## 2023-10-09 RX ORDER — CHOLECALCIFEROL (VITAMIN D3) 125 MCG
1 CAPSULE ORAL DAILY
COMMUNITY
Start: 2023-08-23

## 2023-10-09 RX ORDER — FEXOFENADINE HCL 180 MG
180 TABLET ORAL DAILY
COMMUNITY
Start: 2023-08-23

## 2023-10-09 NOTE — PROGRESS NOTES
Specialty Medication Service    Patient's Name: Lucía Alan YOB: 1965      Reason for visit: Lucía Alan is a 62 y.o. female presenting today for Specialty Medication Service visit follow up. Patient last seen by [facility] 04/21/2023. Patient continues on SMS formulary medication, Dupixent. Pharmacy completed Specialty Medication Service visit for medication monitoring and counseling. Medication list updated. Specialty Medication: Dupixent 300 mg   Frequency: Every 14 days    Indication: Atopic dermatitis    Initially Diagnosed: Teenager    Additional Therapy:     Diprolene topical cream PRN flares (rarely uses)       Previous Therapy:     Numerous topical medications (all were ineffective)        Specialist:    Naomi Alvarez MD   [facility]   [address] [facility] [address] [place] [address] [address]   Phone: [phone]  Specialist Progress Note Available: No, office refuses to provide. Patient states her office told her they will not provide us any notes. Requested  the patient ask again for the benefit of continued care in our program. Patient agreeable. Last Specialist Visit: n/a    Allergies   Allergen Reactions    Other Other (See Comments)     Seasonal allergies - nasal symptoms     Seasonal Other (See Comments)       Past Medical History:   Diagnosis Date    Asthma     HTN (hypertension)       Social History     Tobacco Use    Smoking status: Never    Smokeless tobacco: Not on file   Substance Use Topics    Alcohol use: No     No family history on file. INTERM HISTORY  Have you been diagnosed with any additional conditions since we last talked? no  Have you developed any new allergies since we last talked? no  Have you stopped taking any medications or supplements since we last talked? no  Have you started taking any additional medications or supplements since we last talked?  no    REVIEW OF CURRENT DISEASE STATE  Dermatitis/Eczema: Lucía Alan is here for

## 2023-10-10 ENCOUNTER — PHARMACY VISIT (OUTPATIENT)
Facility: HOSPITAL | Age: 58
End: 2023-10-10

## 2023-10-10 DIAGNOSIS — L20.9 ATOPIC DERMATITIS, UNSPECIFIED TYPE: Primary | ICD-10-CM

## 2023-10-10 ASSESSMENT — PATIENT HEALTH QUESTIONNAIRE - PHQ9
SUM OF ALL RESPONSES TO PHQ QUESTIONS 1-9: 0
2. FEELING DOWN, DEPRESSED OR HOPELESS: 0
1. LITTLE INTEREST OR PLEASURE IN DOING THINGS: 0
SUM OF ALL RESPONSES TO PHQ QUESTIONS 1-9: 0
SUM OF ALL RESPONSES TO PHQ9 QUESTIONS 1 & 2: 0
SUM OF ALL RESPONSES TO PHQ QUESTIONS 1-9: 0
SUM OF ALL RESPONSES TO PHQ QUESTIONS 1-9: 0

## 2023-11-09 ENCOUNTER — TELEPHONE (OUTPATIENT)
Facility: HOSPITAL | Age: 58
End: 2023-11-09

## 2023-11-09 DIAGNOSIS — L20.9 ATOPIC DERMATITIS, UNSPECIFIED TYPE: Primary | ICD-10-CM

## 2023-11-09 RX ORDER — DUPILUMAB 300 MG/2ML
INJECTION, SOLUTION SUBCUTANEOUS
Qty: 4 ML | Refills: 5 | Status: SHIPPED | OUTPATIENT
Start: 2023-11-09

## 2023-11-09 NOTE — TELEPHONE ENCOUNTER
Specialty Medication Service    Date: 11/9/2023  Patient's Name: Lesley Bravo YOB: 1965            _____________________________________________________________________________________________    Lesley Bravo is a 62 y.o. female enrolled in the Specialty Medication Service. We received a refill request for Dupixent on 11/09/23. Original Rx was written for 6 fills on 11/07/23. CLINICAL PHARMACY NOTE - SPECIALTY MEDICATION SERVICE      Lesley Bravo is a 62 y.o.  female enrolled in the Specialty Medication Service. Patient does have a signed CPA on file. Chart reviewed and patient is compliant with SMS program requirements. Labs (not needed for Dupixent); No significant concerns noted. Next SMS visit scheduled/anticipated for 04/2024. Will approve refill for 180 day supply per original specialist's order.       Orders Placed This Encounter    dupilumab (DUPIXENT) 300 MG/2ML SOPN injection     Sig: Inject 300 mg (1 pen) under the skin every 14 days     Dispense:  4 mL     Refill:  [address] [place], [facility]  Ambulatory Clinical Pharmacist  Specialty Medication Services  Phone: [phone] option #4  Fax: [phone]

## 2024-01-02 ENCOUNTER — TELEPHONE (OUTPATIENT)
Facility: HOSPITAL | Age: 59
End: 2024-01-02

## 2024-01-02 NOTE — TELEPHONE ENCOUNTER
Specialty Medication Service    Date: 1/2/2024  Patient's Name: Lexy Alvarez YOB: 1965            _____________________________________________________________________________________________    Patient no longer has [employer] benefits (termed - [employer] 1/1/24). Patient is no longer enrolled in SMS program. Reached patient to explain they will no longer be eligible for SMS services and that we will be discharging them from the program.  Informed patient future SMS appointments will be canceled and no further outreach planned.  Patient instructed to contact [facility] ([phone]) to provide updated insurance information for continuity in care if possible.  and In[facility] Next call scheduled 01/09/24.     In addition, mailed letter/sent Skylineshart message (if applicable), updated SMS spreadsheet, deactivated any current SMS prescriptions and updated [employer] as well as alerted the pharmacy.     Florentino Garner, PharmD [facility]  Ambulatory Clinical Pharmacist  Specialty Medication Services  Phone: [phone] option #4  Fax: [phone]    For Pharmacy Admin Tracking Only    Program: SMS  CPA in place:  Yes    Time Spent (min): 15

## 2024-08-18 ENCOUNTER — APPOINTMENT (OUTPATIENT)
Facility: HOSPITAL | Age: 59
End: 2024-08-18
Payer: COMMERCIAL

## 2024-08-18 ENCOUNTER — HOSPITAL ENCOUNTER (EMERGENCY)
Facility: HOSPITAL | Age: 59
Discharge: HOME | End: 2024-08-18
Attending: EMERGENCY MEDICINE
Payer: COMMERCIAL

## 2024-08-18 VITALS
TEMPERATURE: 98 F | SYSTOLIC BLOOD PRESSURE: 158 MMHG | HEART RATE: 128 BPM | BODY MASS INDEX: 33.13 KG/M2 | HEIGHT: 63 IN | RESPIRATION RATE: 16 BRPM | WEIGHT: 187 LBS | DIASTOLIC BLOOD PRESSURE: 89 MMHG | OXYGEN SATURATION: 98 %

## 2024-08-18 DIAGNOSIS — H81.11 BENIGN PAROXYSMAL POSITIONAL VERTIGO OF RIGHT EAR: Primary | ICD-10-CM

## 2024-08-18 LAB
ALBUMIN SERPL-MCNC: 3.9 G/DL (ref 3.4–5)
ALBUMIN/GLOB SERPL: 1 (ref 0.8–1.7)
ALP SERPL-CCNC: 114 U/L (ref 45–117)
ALT SERPL-CCNC: 25 U/L (ref 13–56)
ANION GAP SERPL CALC-SCNC: 6 MMOL/L (ref 3–18)
AST SERPL-CCNC: 26 U/L (ref 10–38)
BASOPHILS # BLD: 0.1 K/UL (ref 0–0.1)
BASOPHILS NFR BLD: 1 % (ref 0–2)
BILIRUB SERPL-MCNC: 0.4 MG/DL (ref 0.2–1)
BUN SERPL-MCNC: 15 MG/DL (ref 7–18)
BUN/CREAT SERPL: 16 (ref 12–20)
CALCIUM SERPL-MCNC: 9.9 MG/DL (ref 8.5–10.1)
CHLORIDE SERPL-SCNC: 107 MMOL/L (ref 100–111)
CO2 SERPL-SCNC: 27 MMOL/L (ref 21–32)
CREAT SERPL-MCNC: 0.95 MG/DL (ref 0.6–1.3)
DIFFERENTIAL METHOD BLD: ABNORMAL
EKG ATRIAL RATE: 76 BPM
EKG DIAGNOSIS: NORMAL
EKG P AXIS: 55 DEGREES
EKG P-R INTERVAL: 202 MS
EKG Q-T INTERVAL: 424 MS
EKG QRS DURATION: 86 MS
EKG QTC CALCULATION (BAZETT): 477 MS
EKG R AXIS: -11 DEGREES
EKG T AXIS: 35 DEGREES
EKG VENTRICULAR RATE: 76 BPM
EOSINOPHIL # BLD: 0.2 K/UL (ref 0–0.4)
EOSINOPHIL NFR BLD: 2 % (ref 0–5)
ERYTHROCYTE [DISTWIDTH] IN BLOOD BY AUTOMATED COUNT: 14.6 % (ref 11.6–14.5)
GLOBULIN SER CALC-MCNC: 3.8 G/DL (ref 2–4)
GLUCOSE BLD STRIP.AUTO-MCNC: 142 MG/DL (ref 70–110)
GLUCOSE SERPL-MCNC: 156 MG/DL (ref 74–99)
HCT VFR BLD AUTO: 39.5 % (ref 35–45)
HGB BLD-MCNC: 12.6 G/DL (ref 12–16)
IMM GRANULOCYTES # BLD AUTO: 0 K/UL (ref 0–0.04)
IMM GRANULOCYTES NFR BLD AUTO: 1 % (ref 0–0.5)
INR PPP: 1 (ref 0.9–1.1)
LYMPHOCYTES # BLD: 3.7 K/UL (ref 0.9–3.6)
LYMPHOCYTES NFR BLD: 49 % (ref 21–52)
MAGNESIUM SERPL-MCNC: 2.1 MG/DL (ref 1.6–2.6)
MCH RBC QN AUTO: 25.1 PG (ref 24–34)
MCHC RBC AUTO-ENTMCNC: 31.9 G/DL (ref 31–37)
MCV RBC AUTO: 78.7 FL (ref 78–100)
MONOCYTES # BLD: 0.6 K/UL (ref 0.05–1.2)
MONOCYTES NFR BLD: 8 % (ref 3–10)
NEUTS SEG # BLD: 2.9 K/UL (ref 1.8–8)
NEUTS SEG NFR BLD: 39 % (ref 40–73)
NRBC # BLD: 0 K/UL (ref 0–0.01)
NRBC BLD-RTO: 0 PER 100 WBC
PLATELET # BLD AUTO: 337 K/UL (ref 135–420)
PMV BLD AUTO: 10 FL (ref 9.2–11.8)
POTASSIUM SERPL-SCNC: 3.4 MMOL/L (ref 3.5–5.5)
PROT SERPL-MCNC: 7.7 G/DL (ref 6.4–8.2)
PROTHROMBIN TIME: 12.9 SEC (ref 11.9–14.9)
RBC # BLD AUTO: 5.02 M/UL (ref 4.2–5.3)
SODIUM SERPL-SCNC: 140 MMOL/L (ref 136–145)
TROPONIN I SERPL HS-MCNC: 4 NG/L (ref 0–54)
WBC # BLD AUTO: 7.4 K/UL (ref 4.6–13.2)

## 2024-08-18 PROCEDURE — 83735 ASSAY OF MAGNESIUM: CPT

## 2024-08-18 PROCEDURE — 96374 THER/PROPH/DIAG INJ IV PUSH: CPT

## 2024-08-18 PROCEDURE — 85025 COMPLETE CBC W/AUTO DIFF WBC: CPT

## 2024-08-18 PROCEDURE — 84484 ASSAY OF TROPONIN QUANT: CPT

## 2024-08-18 PROCEDURE — 6370000000 HC RX 637 (ALT 250 FOR IP): Performed by: EMERGENCY MEDICINE

## 2024-08-18 PROCEDURE — 71045 X-RAY EXAM CHEST 1 VIEW: CPT

## 2024-08-18 PROCEDURE — 80053 COMPREHEN METABOLIC PANEL: CPT

## 2024-08-18 PROCEDURE — 96376 TX/PRO/DX INJ SAME DRUG ADON: CPT

## 2024-08-18 PROCEDURE — 85610 PROTHROMBIN TIME: CPT

## 2024-08-18 PROCEDURE — 2580000003 HC RX 258: Performed by: EMERGENCY MEDICINE

## 2024-08-18 PROCEDURE — 70450 CT HEAD/BRAIN W/O DYE: CPT

## 2024-08-18 PROCEDURE — 96361 HYDRATE IV INFUSION ADD-ON: CPT

## 2024-08-18 PROCEDURE — 6360000004 HC RX CONTRAST MEDICATION: Performed by: EMERGENCY MEDICINE

## 2024-08-18 PROCEDURE — 82962 GLUCOSE BLOOD TEST: CPT

## 2024-08-18 PROCEDURE — 93005 ELECTROCARDIOGRAM TRACING: CPT | Performed by: EMERGENCY MEDICINE

## 2024-08-18 PROCEDURE — 70498 CT ANGIOGRAPHY NECK: CPT

## 2024-08-18 PROCEDURE — 93010 ELECTROCARDIOGRAM REPORT: CPT | Performed by: INTERNAL MEDICINE

## 2024-08-18 PROCEDURE — 99285 EMERGENCY DEPT VISIT HI MDM: CPT

## 2024-08-18 PROCEDURE — 6360000002 HC RX W HCPCS: Performed by: EMERGENCY MEDICINE

## 2024-08-18 RX ORDER — MECLIZINE HCL 12.5 MG/1
25 TABLET ORAL
Status: COMPLETED | OUTPATIENT
Start: 2024-08-18 | End: 2024-08-18

## 2024-08-18 RX ORDER — ONDANSETRON 2 MG/ML
4 INJECTION INTRAMUSCULAR; INTRAVENOUS
Status: COMPLETED | OUTPATIENT
Start: 2024-08-18 | End: 2024-08-18

## 2024-08-18 RX ORDER — 0.9 % SODIUM CHLORIDE 0.9 %
1000 INTRAVENOUS SOLUTION INTRAVENOUS ONCE
Status: COMPLETED | OUTPATIENT
Start: 2024-08-18 | End: 2024-08-18

## 2024-08-18 RX ORDER — DIAZEPAM 5 MG/1
5 TABLET ORAL EVERY 8 HOURS PRN
Qty: 12 TABLET | Refills: 0 | Status: SHIPPED | OUTPATIENT
Start: 2024-08-18 | End: 2024-08-28

## 2024-08-18 RX ORDER — DIAZEPAM 5 MG/1
5 TABLET ORAL ONCE
Status: COMPLETED | OUTPATIENT
Start: 2024-08-18 | End: 2024-08-18

## 2024-08-18 RX ORDER — ONDANSETRON 4 MG/1
4 TABLET, ORALLY DISINTEGRATING ORAL EVERY 8 HOURS PRN
Qty: 20 TABLET | Refills: 0 | Status: SHIPPED | OUTPATIENT
Start: 2024-08-18

## 2024-08-18 RX ORDER — MECLIZINE HYDROCHLORIDE 25 MG/1
25 TABLET ORAL 3 TIMES DAILY PRN
Qty: 15 TABLET | Refills: 0 | Status: SHIPPED | OUTPATIENT
Start: 2024-08-18 | End: 2024-08-28

## 2024-08-18 RX ADMIN — ONDANSETRON 4 MG: 2 INJECTION INTRAMUSCULAR; INTRAVENOUS at 09:08

## 2024-08-18 RX ADMIN — SODIUM CHLORIDE 1000 ML: 9 INJECTION, SOLUTION INTRAVENOUS at 09:07

## 2024-08-18 RX ADMIN — DIAZEPAM 5 MG: 5 TABLET ORAL at 10:41

## 2024-08-18 RX ADMIN — ONDANSETRON 4 MG: 2 INJECTION INTRAMUSCULAR; INTRAVENOUS at 12:42

## 2024-08-18 RX ADMIN — IOPAMIDOL 100 ML: 755 INJECTION, SOLUTION INTRAVENOUS at 08:33

## 2024-08-18 RX ADMIN — MECLIZINE HCL 12.5 MG 25 MG: 12.5 TABLET ORAL at 09:08

## 2024-08-18 NOTE — ED TRIAGE NOTES
Patient was at work bent over had sudden onset of vomiting and dizziness. Patient unsteady on feet. Had to have assistance from chair to wheelchair. Dr. Cash in to see patient in triage. Code S called from triage patient to ct

## 2024-08-18 NOTE — DISCHARGE INSTRUCTIONS
Thank you for choosing [facility]'s Emergency Department for your care. It is our privilege to provide excellent care for you in your time of need. In the next several days, you may receive a survey via mail or email about your experience with our team. We would appreciate you taking a few minutes to complete the survey, as we use this information to learn what we have done well and what we could be doing better. Thank you for trusting us with your care.    ------------------------------------------------------------------------------  Below you will find a list of your tests from today's visit.   Labs  Recent Results (from the past 12 hour(s))   POCT Glucose    Collection Time: 08/18/24  8:02 AM   Result Value Ref Range    POC Glucose 142 (H) 70 - 110 mg/dL   CBC with Auto Differential    Collection Time: 08/18/24  8:12 AM   Result Value Ref Range    WBC 7.4 4.6 - 13.2 K/uL    RBC 5.02 4.20 - 5.30 M/uL    Hemoglobin 12.6 12.0 - 16.0 g/dL    Hematocrit 39.5 35.0 - 45.0 %    MCV 78.7 78.0 - 100.0 FL    MCH 25.1 24.0 - 34.0 PG    MCHC 31.9 31.0 - 37.0 g/dL    RDW 14.6 (H) 11.6 - 14.5 %    Platelets 337 135 - 420 K/uL    MPV 10.0 9.2 - 11.8 FL    Nucleated RBCs 0.0 0  WBC    nRBC 0.00 0.00 - 0.01 K/uL    Neutrophils % 39 (L) 40 - 73 %    Lymphocytes % 49 21 - 52 %    Monocytes % 8 3 - 10 %    Eosinophils % 2 0 - 5 %    Basophils % 1 0 - 2 %    Immature Granulocytes % 1 (H) 0.0 - 0.5 %    Neutrophils Absolute 2.9 1.8 - 8.0 K/UL    Lymphocytes Absolute 3.7 (H) 0.9 - 3.6 K/UL    Monocytes Absolute 0.6 0.05 - 1.2 K/UL    Eosinophils Absolute 0.2 0.0 - 0.4 K/UL    Basophils Absolute 0.1 0.0 - 0.1 K/UL    Immature Granulocytes Absolute 0.0 0.00 - 0.04 K/UL    Differential Type AUTOMATED     Comprehensive Metabolic Panel w/ Reflex to MG    Collection Time: 08/18/24  8:12 AM   Result Value Ref Range    Sodium 140 136 - 145 mmol/L    Potassium 3.4 (L) 3.5 - 5.5 mmol/L    Chloride 107 100 - 111 mmol/L

## 2024-08-18 NOTE — ED PROVIDER NOTES
tablet by mouth      phentermine (ADIPEX-P) 37.5 MG tablet Take 1 tablet by mouth daily.      potassium chloride (KLOR-CON M) 20 MEQ extended release tablet Take 1 tablet by mouth daily      albuterol sulfate HFA (PROVENTIL;VENTOLIN;PROAIR) 108 (90 Base) MCG/ACT inhaler Inhale 2 puffs into the lungs every 4 hours as needed      augmented betamethasone dipropionate (DIPROLENE-AF) 0.05 % cream Apply topically 2 times daily             DISCONTINUED MEDICATIONS:  Current Discharge Medication List          PATIENT REFERRED TO:  Follow Up with:  Cholo Blevins MD  [address] [facility]  [address]  [place] [facility] [place] [address] [phone]    Schedule an appointment as soon as possible for a visit   As soon as possible, For follow up from the Emergency Department    Edin Walker MD  [address] [facility]  [address]  [place] [id number] [phone]    Schedule an appointment as soon as possible for a visit   With your PCP, As soon as possible, For follow up from the Emergency Department    [facility] EMERGENCY DEPT  [address]  [place] [address]  [phone]    As needed, If symptoms worsen      I David Joseph MD am the primary clinician of record.    Dragon Disclaimer     Please note that this dictation was completed with nothingGrinder, the computer voice recognition software.  Quite often unanticipated grammatical, syntax, homophones, and other interpretive errors are inadvertently transcribed by the computer software.  Please disregard these errors.  Please excuse any errors that have escaped final proofreading.    David Joseph MD  (Electronically signed)            David Joseph MD  08/18/24 [id number]

## 2024-09-30 ENCOUNTER — HOSPITAL ENCOUNTER (OUTPATIENT)
Facility: HOSPITAL | Age: 59
Discharge: HOME | End: 2024-10-03
Attending: OTOLARYNGOLOGY
Payer: COMMERCIAL

## 2024-09-30 DIAGNOSIS — K11.23 CHRONIC SIALOADENITIS: ICD-10-CM

## 2024-09-30 PROCEDURE — 6360000004 HC RX CONTRAST MEDICATION: Performed by: OTOLARYNGOLOGY

## 2024-09-30 PROCEDURE — 70491 CT SOFT TISSUE NECK W/DYE: CPT

## 2024-09-30 RX ORDER — IOPAMIDOL 612 MG/ML
100 INJECTION, SOLUTION INTRAVASCULAR
Status: COMPLETED | OUTPATIENT
Start: 2024-09-30 | End: 2024-09-30

## 2024-09-30 RX ADMIN — IOPAMIDOL 100 ML: 612 INJECTION, SOLUTION INTRAVENOUS at 11:42
